# Patient Record
(demographics unavailable — no encounter records)

---

## 2020-01-30 NOTE — OP
DATE OF PROCEDURE:  01/30/2020



PREOPERATIVE DIAGNOSES:  

1. Chronic sinusitis.

2. Deviated septum.

3. Hypertrophic inferior turbinates.

4. Chronic frontal pain.

5. Pansinusitis.



POSTOPERATIVE DIAGNOSES:  

1. Chronic sinusitis.

2. Deviated septum.

3. Hypertrophic inferior turbinates.

4. Chronic frontal pain.

5. Pansinusitis.



PROCEDURES PERFORMED:  

1. Bilateral nasal endoscopy with maxillary antrostomy with removal of tissue.

2. Bilateral nasal endoscopy with total ethmoidectomy.

3. Bilateral nasal endoscopy with frontal sinusotomy.

4. Right frontal sinusotomy with removal of osteoma.

5. Septoplasty.

6. Bilateral nasal endoscopy with submucosal resection of inferior turbinates.



DESCRIPTION OF PROCEDURE:  BILATERAL NASAL ENDOSCOPY WITH MAXILLARY ANTROSTOMY WITH

REMOVAL OF TISSUE: 



The uncinate was then identified and the extent of the uncinate was appreciated by

out-fracturing the uncinate with the ball-tip probe.  We then used the sickle blade

to disarticulate the uncinate from the lateral nasal wall.  This was then removed

with straight biting and upbiting punches with the remaining shrouds of mucosa and

bony septum removed with the micro-debrider.  The natural os of the maxillary sinus

was then identified and enlarged with the maxillary punches and back biting forceps. 



BILATERAL NASAL ENDOSCOPY WITH TOTAL ETHMOIDECTOMY: 



The anterior face of the ethmoid bulla was entered and with the micro-debrider,

dissection continued posteriorly to the ground lamella.  The limits of dissection

included the insertion of the middle turbinate, medial orbital wall, and base of

skull.  We similarly identified the frontal recess and removed shrouds of bone and

debris in that region to obtain patency into the agger nasi region and frontal

recess.  We then entered the ground lamella and its anteroinferior aspect and

proceeded posteriorly, opening the posterior ethmoid air-cell system.  Again, the

limits of dissection included the base of skull and medial orbital wall. 



BILATERAL NASAL ENDOSCOPY WITH FRONTAL SINUSOTOMY: 



Following the ethmoidectomy, we then turned our attention to the frontal nasal

recess. The agger nasi cells were addressed and the frontal recess was exposed. The

natural opening to the frontal sinus was identified. At this point, any obstructing

shrouds of mucosa and bony fragments were removed with a curved microdebrider. The

wound was then examined and found to be free of any obstructing debris. We then

turned our attention to the contralateral side and performed a similar procedure

again under endoscopic visualization using a 45-degree scope. We were able to

visualize the frontal recess. Obstructing shrouds of mucosa and bone were removed

with a microdebrider. The natural os of frontal sinus was identified and enlarged

and irrigated.  At this point, the frontal sinusotomy was completed and we turned to

the next area of concern. 



RIGHT FRONTAL SINUSOTOMY WITH REMOVAL OF OSTEOMA: 



We encountered a large firm round bony osteoma that obstructed a right frontal sinus

that was contributing to the chronic facial pain.  We got our curved drill and then

carefully drilled away the inferior anterior aspect of the osteoma.  At which point,

it was able to be mobilized with a curved curette from the frontal bone.  This was

then delivered into the nose and the frontal sinusotomy could be completed. 



SEPTOPLASTY: 



After local anesthesia was infiltrated into the submucoperichondrial plane, a

standard Alo incision was made with a #15 blade down to the level of the septal

cartilage.  The caudal elevator was used to elevate the mucoperichondrium from the

underlying cartilage.  We then proceeded beyond the bony cartilaginous junction and

elevated the bony periosteum as well.  Great attention was paid to the spur to

prevent rent formation in the septal flap. A transcartilaginous incision was then

made, while preserving an adequate dorsal and caudal cartilaginous strut for tip

support.  The deformed cartilage was removed and disarticulated from the bony

cartilaginous junction and maxillary crest.  This was placed in saline and would

later be crushed and returned to the mucoperichondrial envelope.  We then elevated

the contralateral periosteum from the bony cartilaginous region and removed the

deformed portions of the bone and bony spurs.  The cartilage was then crushed and

placed back into the mucoperichondrial envelope and the mucosa was re-approximated

with a quilting stitch composed of rapidly absorbent gut suture.  The Alo

incision was also closed with interrupted gut suture.  At the completion of the

case, Potter splints were placed and suture secured to the caudal septum. 



BILATERAL NASAL ENDOSCOPY WITH SUBMUCOSAL RESECTION OF INFERIOR TURBINATES: 



After consent was obtained, the patient was identified, brought to the operating

room, and placed on the operating room table in the supine position.  Consent was

obtained, notifying the patient of the possibility of additional infections,

bleeding, brain injury, and eye/orbital injury.  The patient was placed on the

operating room table, and general endotracheal anesthesia and intravenous access was

obtained.  The patient was then positioned, prepped and draped for endoscopic sinus

surgery.  Nasal preparation included trimming nasal vestibular hairs and spraying in

topical Afrin.  We then placed Afrin topical solution on nasal pledgets and

strategically located them intranasally.  The perinasal mucosa was injected with 1%

lidocaine with 1:100,000 epinephrine in the submucoperichondrial plane of the

septum, lateral nasal wall, and anterior to the uncinate.  The patient was then

prepped and draped in a sterile fashion and positioned for endoscopic sinus surgery. 



With the 0-degree endoscope, the patient underwent systematic nasal endoscopy.

There were no suspicious internasal masses or lesions identified.  We then focused

our attention to the osteomeatal complex region under the middle turbinate. 



The inferior turbinates were visualized with a 0 degree endoscope and outfractured

with a Lynda elevator.  The inferior medial aspect was cauterized with the

electrocautery.  Hemostasis was obtained .  After adequate airway was established,

we turned our attention to the contralateral side and used a similar procedure.

Again, a Lynda elevator was used to outfracture inferior turbinates under endoscopic

visualization.  With a suction cautery, the free inferior medial aspect was

cauterized under direct visualization along the length of the inferior turbinate. 



At this point, we then turned our attention to the contralateral side and proceeded

with endoscopic sinus surgery. 



At the completion of the case, Rice keel splints were placed in the ethmoid cavities

after the ethmoidectomy.  There were no complications.  The patient tolerated the

procedure well and was discharged to the recovery room in stable condition prior to

return to the preoperative day stay with ultimate discharge home.  Prescriptions for

pain medication and antibiotics were provided.  The patient received intramuscular

Depo-Medrol during the case. 







Job ID:  [id number]

## 2020-02-03 NOTE — EKG
Test Reason : PREOP

Blood Pressure : ***/*** mmHG

Vent. Rate : 065 BPM     Atrial Rate : 065 BPM

   P-R Int : 150 ms          QRS Dur : 082 ms

    QT Int : 410 ms       P-R-T Axes : 037 -10 -04 degrees

   QTc Int : 426 ms

 

Normal sinus rhythm

Minimal voltage criteria for LVH, may be normal variant

Borderline ECG

When compared with ECG of 08-FEB-2018 10:32,

No significant change was found

Confirmed by GE BENAVIDEZ ([id number]) on 2/3/2020 12:06:07 AM

 

Referred By:  BERNA           Confirmed By:GE BENAVIDEZ